# Patient Record
Sex: FEMALE | Race: WHITE
[De-identification: names, ages, dates, MRNs, and addresses within clinical notes are randomized per-mention and may not be internally consistent; named-entity substitution may affect disease eponyms.]

---

## 2024-07-23 ENCOUNTER — APPOINTMENT (OUTPATIENT)
Dept: ORTHOPEDIC SURGERY | Facility: CLINIC | Age: 74
End: 2024-07-23
Payer: MEDICARE

## 2024-07-23 VITALS — BODY MASS INDEX: 21.69 KG/M2 | WEIGHT: 135 LBS | HEIGHT: 66 IN

## 2024-07-23 DIAGNOSIS — I10 ESSENTIAL (PRIMARY) HYPERTENSION: ICD-10-CM

## 2024-07-23 DIAGNOSIS — Z86.79 PERSONAL HISTORY OF OTHER DISEASES OF THE CIRCULATORY SYSTEM: ICD-10-CM

## 2024-07-23 DIAGNOSIS — M43.16 SPONDYLOLISTHESIS, LUMBAR REGION: ICD-10-CM

## 2024-07-23 DIAGNOSIS — M54.16 RADICULOPATHY, LUMBAR REGION: ICD-10-CM

## 2024-07-23 PROBLEM — Z00.00 ENCOUNTER FOR PREVENTIVE HEALTH EXAMINATION: Status: ACTIVE | Noted: 2024-07-23

## 2024-07-23 PROCEDURE — 99204 OFFICE O/P NEW MOD 45 MIN: CPT

## 2024-07-23 PROCEDURE — 72170 X-RAY EXAM OF PELVIS: CPT

## 2024-07-23 PROCEDURE — 72100 X-RAY EXAM L-S SPINE 2/3 VWS: CPT

## 2024-07-23 RX ORDER — GEMFIBROZIL 600 MG/1
600 TABLET, FILM COATED ORAL
Refills: 0 | Status: ACTIVE | COMMUNITY

## 2024-07-23 RX ORDER — TRIAMTERENE AND HYDROCHLOROTHIAZIDE 37.5; 25 MG/1; MG/1
37.5-25 CAPSULE ORAL
Refills: 0 | Status: ACTIVE | COMMUNITY

## 2024-07-23 RX ORDER — METHYLPREDNISOLONE 4 MG/1
4 TABLET ORAL
Qty: 1 | Refills: 0 | Status: ACTIVE | COMMUNITY
Start: 2024-07-23 | End: 1900-01-01

## 2024-07-23 RX ORDER — ATORVASTATIN CALCIUM 20 MG/1
20 TABLET, FILM COATED ORAL
Refills: 0 | Status: ACTIVE | COMMUNITY

## 2024-07-23 NOTE — PHYSICAL EXAM
[Flexion] : flexion [Extension] : extension [Bending to left] : bending to left [Bending to right] : bending to right [Right] : right hip [] : no greater trochanteric tenderness

## 2024-07-23 NOTE — IMAGING
[Spondylolithesis] : Spondylolithesis [AP] : anteroposterior [There are no fractures, subluxations or dislocations. No significant abnormalities are seen] : There are no fractures, subluxations or dislocations. No significant abnormalities are seen [FreeTextEntry1] : grade 1 listhesis L5 on S1, disc heights fairly well maintained

## 2024-07-23 NOTE — HISTORY OF PRESENT ILLNESS
[Dull/Aching] : dull/aching [Radiating] : radiating [Tingling] : tingling [de-identified] : pt was carrying/moving heavy boxes and hurt lower back, is feeling radiating pain and numbness down right leg. Pain starts from right buttock, goes to hip and mostly front of thigh, not below knee. No bowel or bladder issues. Went to [facility], had CT abd/pelvis as has h/o abd aortic aneurysm.  [] : no [FreeTextEntry3] : 7/11/2024 [FreeTextEntry7] : down right leg [de-identified] : CT scan

## 2024-08-13 ENCOUNTER — APPOINTMENT (OUTPATIENT)
Dept: ORTHOPEDIC SURGERY | Facility: CLINIC | Age: 74
End: 2024-08-13
Payer: MEDICARE

## 2024-08-13 VITALS — HEIGHT: 66 IN | BODY MASS INDEX: 21.69 KG/M2 | WEIGHT: 135 LBS

## 2024-08-13 DIAGNOSIS — M79.89 OTHER SPECIFIED SOFT TISSUE DISORDERS: ICD-10-CM

## 2024-08-13 PROCEDURE — 99213 OFFICE O/P EST LOW 20 MIN: CPT

## 2024-08-13 RX ORDER — LIDOCAINE 5% 700 MG/1
5 PATCH TOPICAL
Qty: 30 | Refills: 2 | Status: ACTIVE | COMMUNITY
Start: 2024-08-13 | End: 1900-01-01

## 2024-08-13 NOTE — REASON FOR VISIT
[FreeTextEntry2] : 08/13/2024 Back/right leg pain improving, noticed a lump right hip she is concerned about

## 2024-08-13 NOTE — HISTORY OF PRESENT ILLNESS
[Dull/Aching] : dull/aching [Radiating] : radiating [Tingling] : tingling [de-identified] : pt was carrying/moving heavy boxes and hurt lower back, is feeling radiating pain and numbness down right leg. Pain starts from right buttock, goes to hip and mostly front of thigh, not below knee. No bowel or bladder issues. Went to [facility], had CT abd/pelvis as has h/o abd aortic aneurysm.  [] : no [FreeTextEntry3] : 7/11/2024 [FreeTextEntry7] : down right leg [de-identified] : CT scan

## 2024-08-13 NOTE — PHYSICAL EXAM
[FreeTextEntry8] : improved [Right] : right hip [] : no greater trochanteric tenderness [FreeTextEntry3] : soft tissue mass just posterior to GT, firm, mildly tender, non mobile

## 2024-08-17 ENCOUNTER — APPOINTMENT (OUTPATIENT)
Dept: MRI IMAGING | Facility: CLINIC | Age: 74
End: 2024-08-17

## 2024-08-17 PROCEDURE — 73721 MRI JNT OF LWR EXTRE W/O DYE: CPT | Mod: RT

## 2024-09-16 ENCOUNTER — APPOINTMENT (OUTPATIENT)
Dept: ORTHOPEDIC SURGERY | Facility: CLINIC | Age: 74
End: 2024-09-16
Payer: MEDICARE

## 2024-09-16 DIAGNOSIS — M54.16 RADICULOPATHY, LUMBAR REGION: ICD-10-CM

## 2024-09-16 DIAGNOSIS — M43.16 SPONDYLOLISTHESIS, LUMBAR REGION: ICD-10-CM

## 2024-09-16 PROCEDURE — 99213 OFFICE O/P EST LOW 20 MIN: CPT

## 2024-09-16 NOTE — HISTORY OF PRESENT ILLNESS
[Dull/Aching] : dull/aching [Radiating] : radiating [Tingling] : tingling [de-identified] : pt was carrying/moving heavy boxes and hurt lower back, is feeling radiating pain and numbness down right leg. Pain starts from right buttock, goes to hip and mostly front of thigh, not below knee. No bowel or bladder issues. Went to [facility], had CT abd/pelvis as has h/o abd aortic aneurysm.  [] : no [FreeTextEntry3] : 7/11/2024 [FreeTextEntry7] : down right leg [de-identified] : CT scan

## 2024-09-16 NOTE — ASSESSMENT
[FreeTextEntry1] : Soft tissue mass may be fatty tissue- nothing to worry about PT ordered, Lidoderm patches renewed

## 2024-09-16 NOTE — REASON FOR VISIT
[FreeTextEntry2] : 09/16/2024 Had MRI: no soft tissue mass. Has labral tear hip 08/13/2024 Back/right leg pain improving, noticed a lump right hip she is concerned about